# Patient Record
Sex: FEMALE | Race: WHITE | NOT HISPANIC OR LATINO | URBAN - METROPOLITAN AREA
[De-identification: names, ages, dates, MRNs, and addresses within clinical notes are randomized per-mention and may not be internally consistent; named-entity substitution may affect disease eponyms.]

---

## 2023-05-10 ENCOUNTER — WEB ENCOUNTER (OUTPATIENT)
Dept: URBAN - METROPOLITAN AREA CLINIC 78 | Facility: CLINIC | Age: 43
End: 2023-05-10

## 2023-05-15 ENCOUNTER — OFFICE VISIT (OUTPATIENT)
Dept: URBAN - METROPOLITAN AREA CLINIC 78 | Facility: CLINIC | Age: 43
End: 2023-05-15
Payer: COMMERCIAL

## 2023-05-15 ENCOUNTER — DASHBOARD ENCOUNTERS (OUTPATIENT)
Age: 43
End: 2023-05-15

## 2023-05-15 VITALS
HEART RATE: 81 BPM | HEIGHT: 65 IN | SYSTOLIC BLOOD PRESSURE: 149 MMHG | TEMPERATURE: 98.1 F | RESPIRATION RATE: 16 BRPM | BODY MASS INDEX: 30.49 KG/M2 | WEIGHT: 183 LBS | DIASTOLIC BLOOD PRESSURE: 98 MMHG

## 2023-05-15 DIAGNOSIS — R19.8 CHANGE IN BOWEL HABIT: ICD-10-CM

## 2023-05-15 DIAGNOSIS — K21.9 GERD: ICD-10-CM

## 2023-05-15 DIAGNOSIS — K59.09 CONSTIPATION: ICD-10-CM

## 2023-05-15 PROCEDURE — 99204 OFFICE O/P NEW MOD 45 MIN: CPT | Performed by: INTERNAL MEDICINE

## 2023-05-15 PROCEDURE — 99244 OFF/OP CNSLTJ NEW/EST MOD 40: CPT | Performed by: INTERNAL MEDICINE

## 2023-05-15 RX ORDER — SODIUM PICOSULFATE, MAGNESIUM OXIDE, AND ANHYDROUS CITRIC ACID 12; 3.5; 1 G/175ML; G/175ML; MG/175ML
175 ML THE FIRST DOSE THE EVENING BEFORE AND SECOND DOSE THE MORNING OF COLONOSCOPY LIQUID ORAL ONCE A DAY
Qty: 350 | OUTPATIENT
Start: 2023-05-15 | End: 2023-05-17

## 2023-05-15 NOTE — HPI-TODAY'S VISIT:
Patient was referred by Dr. Shamika Mcallister  A copy of this document will be sent to the physician.   Patient is 42-year-old female  presenting for GI symptoms.  She has a baseline history of reflux for which she was managing with lifestyle change cutting back on wine and OTC medications like Pepcid AC.  She had COVID in end of January beginning of February and post that her symptoms have escalated she was seen in the ER where she was prescribed pantoprazole and her palpitations were attributed to reflux.  Subsequently she did see a cardiologist who started her on metoprolol for PACs.  She is currently optimizing her diet and lifestyle while on acid reducers but but persist to have GI symptoms.  Her other predominant complaint is a change in the stool color and consistency with urgency and defecate 3 problems.  She has the urge to go and strain but not much happens.  Her baseline bowel pattern is is regular every day denies rectal bleeding denies history of constipation no family history of colon cancer or polyp but her father underwent segmental resection of the colon   Reports weight loss of 10 lbs   Last labs with PCP normal  Patient  is undergoing work up for adrenal nodule

## 2023-05-30 ENCOUNTER — OFFICE VISIT (OUTPATIENT)
Dept: URBAN - METROPOLITAN AREA CLINIC 78 | Facility: CLINIC | Age: 43
End: 2023-05-30

## 2024-12-19 ENCOUNTER — OFFICE VISIT (OUTPATIENT)
Dept: URBAN - METROPOLITAN AREA CLINIC 23 | Facility: CLINIC | Age: 44
End: 2024-12-19